# Patient Record
Sex: FEMALE | Race: WHITE | NOT HISPANIC OR LATINO
[De-identification: names, ages, dates, MRNs, and addresses within clinical notes are randomized per-mention and may not be internally consistent; named-entity substitution may affect disease eponyms.]

---

## 2019-07-24 ENCOUNTER — RECORD ABSTRACTING (OUTPATIENT)
Age: 49
End: 2019-07-24

## 2019-07-24 ENCOUNTER — APPOINTMENT (OUTPATIENT)
Dept: ELECTROPHYSIOLOGY | Facility: CLINIC | Age: 49
End: 2019-07-24
Payer: COMMERCIAL

## 2019-07-24 VITALS
BODY MASS INDEX: 23.11 KG/M2 | SYSTOLIC BLOOD PRESSURE: 116 MMHG | HEIGHT: 69 IN | WEIGHT: 156 LBS | DIASTOLIC BLOOD PRESSURE: 70 MMHG | HEART RATE: 58 BPM

## 2019-07-24 DIAGNOSIS — Z87.898 PERSONAL HISTORY OF OTHER SPECIFIED CONDITIONS: ICD-10-CM

## 2019-07-24 DIAGNOSIS — I49.9 CARDIAC ARRHYTHMIA, UNSPECIFIED: ICD-10-CM

## 2019-07-24 DIAGNOSIS — R55 SYNCOPE AND COLLAPSE: ICD-10-CM

## 2019-07-24 DIAGNOSIS — Z82.49 FAMILY HISTORY OF ISCHEMIC HEART DISEASE AND OTHER DISEASES OF THE CIRCULATORY SYSTEM: ICD-10-CM

## 2019-07-24 PROCEDURE — 99244 OFF/OP CNSLTJ NEW/EST MOD 40: CPT

## 2019-07-24 PROCEDURE — 93000 ELECTROCARDIOGRAM COMPLETE: CPT

## 2019-07-24 NOTE — PHYSICAL EXAM
[General Appearance - Well Developed] : well developed [General Appearance - Well Nourished] : well nourished [Well Groomed] : well groomed [General Appearance - In No Acute Distress] : no acute distress [Normal Conjunctiva] : the conjunctiva exhibited no abnormalities [Normal Oral Mucosa] : normal oral mucosa [Normal Jugular Venous V Waves Present] : normal jugular venous V waves present [Normal Oropharynx] : normal oropharynx [Heart Sounds] : normal S1 and S2 [Heart Rate And Rhythm] : heart rate and rhythm were normal [Edema] : no peripheral edema present [Respiration, Rhythm And Depth] : normal respiratory rhythm and effort [Auscultation Breath Sounds / Voice Sounds] : lungs were clear to auscultation bilaterally [Bowel Sounds] : normal bowel sounds [Abdomen Soft] : soft [Abnormal Walk] : normal gait [Nail Clubbing] : no clubbing of the fingernails [Skin Color & Pigmentation] : normal skin color and pigmentation [Cyanosis, Localized] : no localized cyanosis [Skin Turgor] : normal skin turgor [] : no rash [Oriented To Time, Place, And Person] : oriented to person, place, and time [No Anxiety] : not feeling anxious [Impaired Insight] : insight and judgment were intact

## 2019-12-06 NOTE — REVIEW OF SYSTEMS
[Palpitations] : palpitations [Dizziness] : dizziness [Negative] : Integumentary [Fever] : no fever [Shortness Of Breath] : no shortness of breath [Dyspnea on exertion] : not dyspnea during exertion [Chest Pain] : no chest pain [Lower Ext Edema] : no extremity edema [Convulsions] : no convulsions [Confusion] : no confusion was observed [Anxiety] : no anxiety [Easy Bleeding] : no tendency for easy bleeding [Easy Bruising] : no tendency for easy bruising

## 2019-12-06 NOTE — HISTORY OF PRESENT ILLNESS
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 \par \par In 2010 she had frequent PVCs and episodes of palpitation and dizziness, as well as episodes of near syncope with prodrome of warmth. Holter monitor on 8/23/10 revealed nearly 20698 PVCs in 24 hours (13.9% burden). She underwent PVC ablation with Dr. Hughes on 12/6/10- at that time she had very infrequent spontaneous PVCs, but pacemapping and limited activation mapping revealed a suspected location in the septal RVOT and ablation in this region was performed. Normal RV voltage was noted. \par She did continue to have episodes of palpitation following the ablation which were much less frequent and generally mild. \par \par She had an episode of syncope in 2017 after having a bowel movement. No cardiac etiology was identified on workup and she underwent ILR implant in [place] on 3/20/17 (Dr Vikas Lynn). She has not had follow-up after this. \par She continues to note occasional mild palpitation that lasts up to minutes, and other episodes of mild dizziness (unrelated to palpitation). She has had syncope in the past as a child, but no further syncope since the ILR was implanted. \par She generally feels well and exercises regularly without limitation. \par \par Interrogation of her ILR today reveals multiple episodes of narrow complex tachycardia with heart rates of 188-207 bpm, with abrupt offset. There is also one episode of regular nonsustained WCT at 182 bpm lasting 15 beats, and she denies associated symptoms with this. \par

## 2019-12-06 NOTE — REASON FOR VISIT
[Consultation] : a consultation regarding [Palpitations] : palpitations [Syncope] : syncope [FreeTextEntry1] : ref Dr Rodriguez

## 2019-12-06 NOTE — DISCUSSION/SUMMARY
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 She has not had bothersome PVCs since her remote ablation, however, has continued to have episodes of palpitation and dizziness, and ILR interrogation reveals multiple episodes of narrow complex tachycardia likely due to paroxysmal SVT. She has not been using the symptom activator button and it is unclear if her symptoms are related to these arrhythmias, and in addition it is unclear if her syncope was related to this or vagal tone related to straining on toilet. We discussed management options for her suspected symptomatic SVT, including conservative management, medical therapy, and EP study/SVT ablation. An EP study would be useful to clarify her diagnosis, and potentially allow definitive therapy with ablation, particularly give her history of syncope. She does have baseline bradycardia, and may not tolerate medical therapy well, and she does wish to avoid medications if possible. She would like to monitor her symptoms for now, use the symptom activator, and consider EP Study/ablation as needed. \par -f/u in 3 months. \par -will transfer ILR monitoring to [facility] [place] [facility] with remote monitoring. Pt instructed to use symptom activator as needed\par -t/c EPS/ablation if pt agreeable. \par \par  \par

## 2019-12-11 ENCOUNTER — APPOINTMENT (OUTPATIENT)
Dept: ELECTROPHYSIOLOGY | Facility: CLINIC | Age: 49
End: 2019-12-11
Payer: COMMERCIAL

## 2019-12-11 VITALS
WEIGHT: 155 LBS | HEIGHT: 69 IN | BODY MASS INDEX: 22.96 KG/M2 | SYSTOLIC BLOOD PRESSURE: 116 MMHG | DIASTOLIC BLOOD PRESSURE: 72 MMHG | HEART RATE: 64 BPM

## 2019-12-11 PROCEDURE — 93000 ELECTROCARDIOGRAM COMPLETE: CPT

## 2019-12-11 PROCEDURE — 99215 OFFICE O/P EST HI 40 MIN: CPT

## 2019-12-11 NOTE — DISCUSSION/SUMMARY
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 She has not had any recent arrhythmias on ILR and has been generally feeling well. She did previously have paroxysmal SVT and brief WCT noted on ILR, but these were asymptomatic and it has not been clear if these arrhythmias were related at all to her syncope, which was more likely vasovagal in origin. She is anxious to have her ILR removed at this time. I did explain that ongoing monitoring is useful particularly if she does have recurrent syncope, as given the previously identified paroxysmal arrhythmias, correlation of her symptoms would be useful to guide further management. At this point, however, she does insist she wants the ILR removed, and will reconsider monitoring options if her symptoms recur. Risks and benefits of ILR removal were reviewed in detail. \par -Will plan ILR removal. \par  \par

## 2019-12-11 NOTE — PHYSICAL EXAM
[General Appearance - Well Developed] : well developed [General Appearance - In No Acute Distress] : no acute distress [General Appearance - Well Nourished] : well nourished [Well Groomed] : well groomed [Normal Conjunctiva] : the conjunctiva exhibited no abnormalities [Normal Oral Mucosa] : normal oral mucosa [Normal Oropharynx] : normal oropharynx [Normal Jugular Venous V Waves Present] : normal jugular venous V waves present [Respiration, Rhythm And Depth] : normal respiratory rhythm and effort [Auscultation Breath Sounds / Voice Sounds] : lungs were clear to auscultation bilaterally [Heart Rate And Rhythm] : heart rate and rhythm were normal [Heart Sounds] : normal S1 and S2 [Bowel Sounds] : normal bowel sounds [Abdomen Soft] : soft [Edema] : no peripheral edema present [Abnormal Walk] : normal gait [Cyanosis, Localized] : no localized cyanosis [Nail Clubbing] : no clubbing of the fingernails [Skin Color & Pigmentation] : normal skin color and pigmentation [Skin Turgor] : normal skin turgor [Impaired Insight] : insight and judgment were intact [] : no rash [Oriented To Time, Place, And Person] : oriented to person, place, and time [No Anxiety] : not feeling anxious

## 2019-12-11 NOTE — REASON FOR VISIT
[Palpitations] : palpitations [Follow-Up - Clinic] : a clinic follow-up of [FreeTextEntry1] : ILR management [Syncope] : syncope [Family Member] : family member

## 2019-12-11 NOTE — REVIEW OF SYSTEMS
[Fever] : no fever [Shortness Of Breath] : no shortness of breath [Dyspnea on exertion] : not dyspnea during exertion [Lower Ext Edema] : no extremity edema [Palpitations] : no palpitations [Chest Pain] : no chest pain [Dizziness] : no dizziness [Convulsions] : no convulsions [Confusion] : no confusion was observed [Anxiety] : no anxiety [Easy Bruising] : no tendency for easy bruising [Easy Bleeding] : no tendency for easy bleeding [Negative] : Integumentary

## 2019-12-11 NOTE — HISTORY OF PRESENT ILLNESS
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 \par \par In 2010 she had frequent PVCs and episodes of palpitation and dizziness, as well as episodes of near syncope with prodrome of warmth. Holter monitor on 8/23/10 revealed nearly 23660 PVCs in 24 hours (13.9% burden). She underwent PVC ablation with Dr. Hughes on 12/6/10- ablation in the septal RVOT was successful.\par She did continue to have episodes of palpitation following the ablation which were much less frequent and generally mild. \par \par She had an episode of syncope in 2017 after having a bowel movement. No cardiac etiology was identified on workup and she underwent ILR implant in [place] on 3/20/17 (Dr Vikas Lynn). Interrogation of her ILR had revealed multiple episodes of narrow complex tachycardia with heart rates of 188-207 bpm, with abrupt offset. There is also one episode of regular nonsustained WCT at 182 bpm lasting 15 beats, and she denies associated symptoms with this. \par On interrogation of her ILR today, she has had no recent arrhythmia episodes. She denies any recent palpitation. She is frustrated with her ILR, and believes there have been no significant findings over the last 2+ yrs of monitoring, and wants it removed. \par Of note, she has had syncope in the past a few times as a child, possibly vasovagal/situation, but no further syncope since the ILR was implanted. \par \par She generally feels well and exercises regularly without limitation. She takes no medications.\par \par

## 2020-01-21 ENCOUNTER — OUTPATIENT (OUTPATIENT)
Dept: OUTPATIENT SERVICES | Facility: HOSPITAL | Age: 50
LOS: 1 days | End: 2020-01-21
Payer: COMMERCIAL

## 2020-01-21 ENCOUNTER — TRANSCRIPTION ENCOUNTER (OUTPATIENT)
Age: 50
End: 2020-01-21

## 2020-01-21 VITALS
HEART RATE: 79 BPM | SYSTOLIC BLOOD PRESSURE: 90 MMHG | RESPIRATION RATE: 17 BRPM | DIASTOLIC BLOOD PRESSURE: 57 MMHG | OXYGEN SATURATION: 99 %

## 2020-01-21 VITALS
OXYGEN SATURATION: 100 % | RESPIRATION RATE: 16 BRPM | WEIGHT: 156.09 LBS | SYSTOLIC BLOOD PRESSURE: 105 MMHG | DIASTOLIC BLOOD PRESSURE: 59 MMHG | HEART RATE: 64 BPM | TEMPERATURE: 98 F | HEIGHT: 69 IN

## 2020-01-21 DIAGNOSIS — I49.9 CARDIAC ARRHYTHMIA, UNSPECIFIED: ICD-10-CM

## 2020-01-21 DIAGNOSIS — Z98.890 OTHER SPECIFIED POSTPROCEDURAL STATES: Chronic | ICD-10-CM

## 2020-01-21 DIAGNOSIS — Z87.39 PERSONAL HISTORY OF OTHER DISEASES OF THE MUSCULOSKELETAL SYSTEM AND CONNECTIVE TISSUE: Chronic | ICD-10-CM

## 2020-01-21 PROCEDURE — 33286 RMVL SUBQ CAR RHYTHM MNTR: CPT

## 2020-01-21 RX ORDER — ACETAMINOPHEN 500 MG
650 TABLET ORAL ONCE
Refills: 0 | Status: DISCONTINUED | OUTPATIENT
Start: 2020-01-21 | End: 2020-02-05

## 2020-01-21 RX ORDER — CEPHALEXIN 500 MG
500 CAPSULE ORAL ONCE
Refills: 0 | Status: COMPLETED | OUTPATIENT
Start: 2020-01-21 | End: 2020-01-21

## 2020-01-21 RX ADMIN — Medication 500 MILLIGRAM(S): at 12:03

## 2020-01-21 NOTE — DISCHARGE NOTE PROVIDER - NSDCFUADDINST_GEN_ALL_CORE_FT
Follow up with Dr. Dang in 2-3 weeks.  Please call the office to schedule an appointment.   [facility]  [address] [place] [phone]

## 2020-01-21 NOTE — H&P PST ADULT - NSANTHOSAYNRD_GEN_A_CORE
No. ZONIA screening performed.  STOP BANG Legend: 0-2 = LOW Risk; 3-4 = INTERMEDIATE Risk; 5-8 = HIGH Risk

## 2020-01-21 NOTE — H&P PST ADULT - HISTORY OF PRESENT ILLNESS
49 year old female with history of PVCs s/p ablation (septal RVOT w/ Dr. Hughes 12/2010) and syncope s/p ILR implant 3/2017 ([place]- Dr. Vikas Lynn).  Recent interrogation of ILR reveals no recent arrhythmia episodes.  Pt denies any recent palpitations or syncope.  She presents today electively for ILR removal.      Cardiology summary:  ETT 8/8/14: negative for ischemia or induced arrhythmia  Echo: 8/9/14: LVEF 67%, LA 3.3cm, no significant valvular disease 49 year old female with history of PVCs s/p ablation (septal RVOT w/ Dr. Hughes 12/2010) and syncope s/p ILR implant 3/2017 ([place]- Dr. Vikas Lynn).  Recent interrogation of ILR reveals no recent arrhythmia episodes.  Pt denies any recent palpitations or syncope.  She presents today electively for ILR removal.  Pt denies any complaints today.      Cardiology summary:  ETT 8/8/14: negative for ischemia or induced arrhythmia  Echo: 8/9/14: LVEF 67%, LA 3.3cm, no significant valvular disease

## 2020-01-21 NOTE — DISCHARGE NOTE NURSING/CASE MANAGEMENT/SOCIAL WORK - PATIENT PORTAL LINK FT
You can access the FollowMyHealth Patient Portal offered by [facility] by registering at the following website: [email or web address]. By joining [employer]’s FollowMyHealth portal, you will also be able to view your health information using other applications (apps) compatible with our system.

## 2020-01-21 NOTE — DISCHARGE NOTE PROVIDER - CARE PROVIDERS DIRECT ADDRESSES
[email or web address]

## 2020-01-21 NOTE — H&P PST ADULT - NSICDXPASTMEDICALHX_GEN_ALL_CORE_FT
PAST MEDICAL HISTORY:  PVC's (premature ventricular contractions) s/p cardiac ablation Dr. Hughes 12/2010    Syncope s/p ILR implant 3/2017

## 2020-01-21 NOTE — H&P PST ADULT - RS GEN PE MLT RESP DETAILS PC
respirations non-labored/no rhonchi/breath sounds equal/airway patent/good air movement/clear to auscultation bilaterally/no wheezes/no rales

## 2020-01-21 NOTE — DISCHARGE NOTE NURSING/CASE MANAGEMENT/SOCIAL WORK - NURSING SECTION COMPLETE
Order faxed and patient notified  Kiara Perez RN     Patient/Caregiver provided printed discharge information.

## 2020-01-21 NOTE — H&P PST ADULT - NSICDXPASTSURGICALHX_GEN_ALL_CORE_FT
PAST SURGICAL HISTORY:  H/O hand surgery right hand    History of herniated intervertebral disc s/p surgical repair 2001

## 2020-01-21 NOTE — H&P PST ADULT - ASSESSMENT
49 year old female with history of PVCs s/p ablation (septal RVOT w/ Dr. Hughes 12/2010) and syncope s/p ILR implant 3/2017 ([place]- Dr. Vikas Lynn).  Recent interrogation of ILR reveals no recent arrhythmia episodes.  Pt denies any recent palpitations or syncope.  She presents today electively for ILR removal.

## 2020-01-21 NOTE — DISCHARGE NOTE PROVIDER - HOSPITAL COURSE
49 year old female with history of PVCs s/p ablation (septal RVOT w/ Dr. Hughes 12/2010) and syncope s/p ILR implant 3/2017 ([place]- Dr. Vikas Lynn).  Recent interrogation of ILR reveals no recent arrhythmia episodes.  Pt denies any recent palpitations or syncope.  She presented electively *** 49 year old female with history of PVCs s/p ablation (septal RVOT w/ Dr. Hughes 12/2010) and syncope s/p ILR implant 3/2017 ([place]- Dr. Vikas Lynn).  Recent interrogation of ILR reveals no recent arrhythmia episodes.  Pt denies any recent palpitations or syncope.  She presented electively and is now status post uncomplicated loop recorder removal.  The patient was observed per post procedure protocol, then discharged home with a plan for outpatient follow up.

## 2020-01-21 NOTE — DISCHARGE NOTE PROVIDER - CARE PROVIDER_API CALL
Ravi Dang (MD)  Cardiology; Internal Medicine  [address], [address]  [place] [address]  Phone: [phone]  Fax: [phone]  Follow Up Time:     Wili Rodriguez (DO)  Cardiology; Internal Medicine; Nuclear Cardiology  [facility], [address] [place] [address] [address] [place] [address]  Phone: [phone]  Fax: [phone]  Follow Up Time:

## 2023-12-06 ENCOUNTER — OUTPATIENT (OUTPATIENT)
Dept: OUTPATIENT SERVICES | Facility: HOSPITAL | Age: 53
LOS: 1 days | End: 2023-12-06
Payer: COMMERCIAL

## 2023-12-06 ENCOUNTER — APPOINTMENT (OUTPATIENT)
Dept: MRI IMAGING | Facility: CLINIC | Age: 53
End: 2023-12-06
Payer: COMMERCIAL

## 2023-12-06 DIAGNOSIS — Z87.39 PERSONAL HISTORY OF OTHER DISEASES OF THE MUSCULOSKELETAL SYSTEM AND CONNECTIVE TISSUE: Chronic | ICD-10-CM

## 2023-12-06 DIAGNOSIS — Z00.8 ENCOUNTER FOR OTHER GENERAL EXAMINATION: ICD-10-CM

## 2023-12-06 DIAGNOSIS — Z98.890 OTHER SPECIFIED POSTPROCEDURAL STATES: Chronic | ICD-10-CM

## 2023-12-06 PROBLEM — I49.3 VENTRICULAR PREMATURE DEPOLARIZATION: Chronic | Status: ACTIVE | Noted: 2020-01-21

## 2023-12-06 PROBLEM — R55 SYNCOPE AND COLLAPSE: Chronic | Status: ACTIVE | Noted: 2020-01-21

## 2023-12-06 PROCEDURE — 76391 MR ELASTOGRAPHY: CPT | Mod: 26

## 2023-12-06 PROCEDURE — 74183 MRI ABD W/O CNTR FLWD CNTR: CPT | Mod: 26

## 2023-12-06 PROCEDURE — 74183 MRI ABD W/O CNTR FLWD CNTR: CPT

## 2023-12-06 PROCEDURE — 76391 MR ELASTOGRAPHY: CPT

## 2023-12-06 PROCEDURE — A9585: CPT
